# Patient Record
(demographics unavailable — no encounter records)

---

## 2025-07-10 NOTE — HISTORY OF PRESENT ILLNESS
[FreeTextEntry1] : 77 year old male presenting for BPH/LUTS and UTIs Hx of Parkinson's, CAD s/p stents (on plavix), HTN, DMDM   Had spine surgery about 6 months ago Was in rehab afterwards and had multiple UTIs Has been on multiple rounds of antibiotics Last UTI was 1-2 months ago   Weak stream: weak Sensation of incomplete emptying: no Straining: no   Frequency: q60-90 min Urgency: somewhat Nocturia: x3-5 Incontinence: no   Dysuria: no Gross hematuria: no Stones: no Hx of retention: no    Relevant PSx: Cardiac bypass 1994, spine, shoulder Blood thinners: plavix

## 2025-07-10 NOTE — ASSESSMENT
[FreeTextEntry1] : 77 year old male presenting for BPH/LUTS and UTIs Hx of Parkinson's, CAD s/p stents (on plavix), HTN, DM  PVR 170cc  - Start tadalafil 5mg daily - Cut down to tamsulosin 0.4mg qhs - UA/UCx  - Sildenafil 100mg prn for ED - Pt would like to continue see Dr. Kapoor moving forward. Will see him in 2-3 months -